# Patient Record
Sex: MALE | Race: BLACK OR AFRICAN AMERICAN | Employment: FULL TIME | URBAN - METROPOLITAN AREA
[De-identification: names, ages, dates, MRNs, and addresses within clinical notes are randomized per-mention and may not be internally consistent; named-entity substitution may affect disease eponyms.]

---

## 2021-07-23 ENCOUNTER — HOSPITAL ENCOUNTER (OUTPATIENT)
Dept: GENERAL RADIOLOGY | Age: 66
Discharge: HOME | End: 2021-07-23
Payer: MEDICARE

## 2021-07-23 DIAGNOSIS — R06.02 SOB (SHORTNESS OF BREATH): ICD-10-CM

## 2021-07-23 PROCEDURE — 71046 X-RAY EXAM CHEST 2 VIEWS: CPT

## 2021-07-30 PROBLEM — M19.032 OSTEOARTHRITIS OF LEFT WRIST: Status: ACTIVE | Noted: 2020-10-21

## 2021-07-30 PROBLEM — R03.0 ELEVATED BLOOD PRESSURE READING WITHOUT DIAGNOSIS OF HYPERTENSION: Status: ACTIVE | Noted: 2020-10-21

## 2021-07-30 PROBLEM — R19.5 POSITIVE COLORECTAL CANCER SCREENING USING COLOGUARD TEST: Status: ACTIVE | Noted: 2020-10-21

## 2021-07-30 PROBLEM — M19.132 SCAPHOLUNATE ADVANCED COLLAPSE OF LEFT WRIST: Status: ACTIVE | Noted: 2020-10-21

## 2021-07-30 PROBLEM — J45.909 ASTHMA: Status: ACTIVE | Noted: 2021-07-30

## 2022-03-18 PROBLEM — R19.5 POSITIVE COLORECTAL CANCER SCREENING USING COLOGUARD TEST: Status: ACTIVE | Noted: 2020-10-21

## 2022-03-18 PROBLEM — R03.0 ELEVATED BLOOD PRESSURE READING WITHOUT DIAGNOSIS OF HYPERTENSION: Status: ACTIVE | Noted: 2020-10-21

## 2022-03-19 PROBLEM — J45.909 ASTHMA: Status: ACTIVE | Noted: 2021-07-30

## 2022-03-19 PROBLEM — M19.132 SCAPHOLUNATE ADVANCED COLLAPSE OF LEFT WRIST: Status: ACTIVE | Noted: 2020-10-21

## 2022-03-19 PROBLEM — M19.032 OSTEOARTHRITIS OF LEFT WRIST: Status: ACTIVE | Noted: 2020-10-21

## 2023-10-04 ENCOUNTER — NURSE TRIAGE (OUTPATIENT)
Dept: OTHER | Facility: CLINIC | Age: 68
End: 2023-10-04

## 2023-10-04 NOTE — TELEPHONE ENCOUNTER
Location of patient: [place]    Received call from [facility] at [facility]; Patient with Red Flag Complaint requesting to establish care with [facility]. Subjective: Caller states \"I haven't use my Symbicort in a few months. 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 When I'm around it and moving around, I've been experiencing SOB. Current Symptoms: denies any current symptoms    Denies any current CP, SOD, difficulty breathing, dizziness, cough, cold symptoms, wheezing, headache, weakness, numbness, or vision changes. Hx - Asthma    Onset: 3-4 weeks ago; gradual, worsening    Associated Symptoms: NA    Pain Severity: denies any pain     Temperature: denies any fever or chills    What has been tried: albuterol inhaler    LMP: NA Pregnant: NA    Recommended disposition: See in Office Within 2 Weeks; if unable to be seen within above timeframe, instructed caller/patient to go to [facility]/Walk-in or ER (as a last resort)    Care advice provided, patient verbalizes understanding; denies any other questions or concerns; instructed to call back for any new or worsening symptoms. Patient/Caller agrees with recommended disposition; writer provided warm transfer to [facility] [place] [facility] at [facility] for appointment scheduling    Attention Provider: Thank you for allowing me to participate in the care of your patient. The patient was connected to triage in response to information provided to the [facility]. Please do not respond through this encounter as the response is not directed to a shared pool.     Reason for Disposition   [1] MILD longstanding difficulty breathing AND [2]  SAME as normal    Protocols used: Breathing Difficulty-ADULT-AH